# Patient Record
Sex: MALE | Employment: FULL TIME | URBAN - METROPOLITAN AREA
[De-identification: names, ages, dates, MRNs, and addresses within clinical notes are randomized per-mention and may not be internally consistent; named-entity substitution may affect disease eponyms.]

---

## 2020-01-18 ENCOUNTER — OFFICE VISIT (OUTPATIENT)
Dept: URGENT CARE | Facility: RETAIL CLINIC | Age: 45
End: 2020-01-18
Payer: COMMERCIAL

## 2020-01-18 VITALS
SYSTOLIC BLOOD PRESSURE: 126 MMHG | OXYGEN SATURATION: 96 % | DIASTOLIC BLOOD PRESSURE: 89 MMHG | HEART RATE: 77 BPM | TEMPERATURE: 98.2 F

## 2020-01-18 DIAGNOSIS — Q18.1 CYST OF EAR CANAL: Primary | ICD-10-CM

## 2020-01-18 PROCEDURE — 99202 OFFICE O/P NEW SF 15 MIN: CPT | Performed by: PHYSICIAN ASSISTANT

## 2020-01-18 RX ORDER — DOXYCYCLINE 100 MG/1
100 CAPSULE ORAL 2 TIMES DAILY
Qty: 14 CAPSULE | Refills: 0 | Status: SHIPPED | OUTPATIENT
Start: 2020-01-18 | End: 2020-01-25

## 2020-01-18 RX ORDER — LISINOPRIL 10 MG/1
TABLET ORAL
COMMUNITY
Start: 2019-06-14

## 2020-01-18 RX ORDER — MOMETASONE FUROATE MONOHYDRATE 50 UG/1
SPRAY, METERED NASAL
COMMUNITY
Start: 2019-10-18

## 2020-01-18 NOTE — PROGRESS NOTES
SUBJECTIVE:  Joseph De La Cruz is a 44 year old male who presents with left ear pain for 2 week(s). Some drainage occurred 2 days ago.   Severity: mild now after drainage relieved pain.   Timing:sudden onset  Additional symptoms include enlarging with time the cyst he describes but now with less pain. .      History of recurrent otitis: not applicable    History reviewed. No pertinent past medical history.  Current Outpatient Medications   Medication Sig Dispense Refill     Calcium Citrate-Vitamin D (CALCIUM CITRATE + D3 PO)        lisinopril (PRINIVIL/ZESTRIL) 10 MG tablet TAKE ONE (1) TABLET BY MOUTH DAILY.       mometasone (NASONEX) 50 MCG/ACT nasal spray        Social History     Tobacco Use     Smoking status: Never Smoker     Smokeless tobacco: Never Used   Substance Use Topics     Alcohol use: Not Currently       ROS:   CONSTITUTIONAL:NEGATIVE for fever, chills, change in weight  INTEGUMENTARY/SKIN: rash ears left    OBJECTIVE:  /89   Pulse 77   Temp 98.2  F (36.8  C) (Temporal)   SpO2 96%    EXAM:  The right TM is normal: no effusions, no erythema, and normal landmarks     The right auditory canal is normal and without drainage, edema or erythema  The left TM is normal: no effusions, no erythema, and normal landmarks  The left auditory canal is swollen, tender and 4mm lesion with scap    Procedure: alcohol swabbed left EAC entrance. Then #11 blade incised lesion with moderate pus and serosanginous fluid drained forth. Mild tenderness. Gauze placed over lesion then tape applied to hold gauze and anchored to auricle.     ASSESSMENT:    1. Cyst of ear canal    - doxycycline hyclate (VIBRAMYCIN) 100 MG capsule; Take 1 capsule (100 mg) by mouth 2 times daily for 7 days  Dispense: 14 capsule; Refill: 0    PLAN: follow up primary clinic if not improving or worsening over the next week.   See orders in Epic